# Patient Record
Sex: FEMALE | URBAN - METROPOLITAN AREA
[De-identification: names, ages, dates, MRNs, and addresses within clinical notes are randomized per-mention and may not be internally consistent; named-entity substitution may affect disease eponyms.]

---

## 2021-04-09 ENCOUNTER — OUTPATIENT (OUTPATIENT)
Dept: OUTPATIENT SERVICES | Facility: HOSPITAL | Age: 51
LOS: 1 days | End: 2021-04-09
Payer: SELF-PAY

## 2021-04-09 ENCOUNTER — APPOINTMENT (OUTPATIENT)
Dept: CT IMAGING | Facility: HOSPITAL | Age: 51
End: 2021-04-09

## 2021-04-09 PROCEDURE — 75571 CT HRT W/O DYE W/CA TEST: CPT

## 2021-04-09 PROCEDURE — 75571 CT HRT W/O DYE W/CA TEST: CPT | Mod: 26

## 2021-08-06 ENCOUNTER — RESULT REVIEW (OUTPATIENT)
Age: 51
End: 2021-08-06

## 2021-12-04 ENCOUNTER — APPOINTMENT (OUTPATIENT)
Dept: MAMMOGRAPHY | Facility: CLINIC | Age: 51
End: 2021-12-04
Payer: COMMERCIAL

## 2021-12-04 ENCOUNTER — APPOINTMENT (OUTPATIENT)
Dept: ULTRASOUND IMAGING | Facility: CLINIC | Age: 51
End: 2021-12-04
Payer: COMMERCIAL

## 2021-12-04 PROCEDURE — 77067 SCR MAMMO BI INCL CAD: CPT

## 2021-12-04 PROCEDURE — 76641 ULTRASOUND BREAST COMPLETE: CPT | Mod: 50

## 2021-12-04 PROCEDURE — 77063 BREAST TOMOSYNTHESIS BI: CPT

## 2022-12-10 ENCOUNTER — APPOINTMENT (OUTPATIENT)
Dept: ULTRASOUND IMAGING | Facility: CLINIC | Age: 52
End: 2022-12-10

## 2022-12-10 ENCOUNTER — APPOINTMENT (OUTPATIENT)
Dept: MAMMOGRAPHY | Facility: CLINIC | Age: 52
End: 2022-12-10

## 2022-12-10 PROCEDURE — 76641 ULTRASOUND BREAST COMPLETE: CPT | Mod: 50

## 2022-12-10 PROCEDURE — 77063 BREAST TOMOSYNTHESIS BI: CPT

## 2022-12-10 PROCEDURE — 77067 SCR MAMMO BI INCL CAD: CPT

## 2023-01-25 PROBLEM — Z76.89 ESTABLISHING CARE WITH NEW DOCTOR, ENCOUNTER FOR: Status: ACTIVE | Noted: 2023-01-25

## 2023-01-26 ENCOUNTER — APPOINTMENT (OUTPATIENT)
Dept: GYNECOLOGIC ONCOLOGY | Facility: CLINIC | Age: 53
End: 2023-01-26
Payer: COMMERCIAL

## 2023-01-26 VITALS
SYSTOLIC BLOOD PRESSURE: 138 MMHG | HEIGHT: 64 IN | DIASTOLIC BLOOD PRESSURE: 85 MMHG | HEART RATE: 76 BPM | WEIGHT: 175 LBS | BODY MASS INDEX: 29.88 KG/M2

## 2023-01-26 DIAGNOSIS — Z76.89 PERSONS ENCOUNTERING HEALTH SERVICES IN OTHER SPECIFIED CIRCUMSTANCES: ICD-10-CM

## 2023-01-26 DIAGNOSIS — Z87.898 PERSONAL HISTORY OF OTHER SPECIFIED CONDITIONS: ICD-10-CM

## 2023-01-26 DIAGNOSIS — Z78.9 OTHER SPECIFIED HEALTH STATUS: ICD-10-CM

## 2023-01-26 DIAGNOSIS — H04.123 DRY EYE SYNDROME OF BILATERAL LACRIMAL GLANDS: ICD-10-CM

## 2023-01-26 PROCEDURE — 99203 OFFICE O/P NEW LOW 30 MIN: CPT | Mod: 25

## 2023-01-26 PROCEDURE — 57456 ENDOCERV CURETTAGE W/SCOPE: CPT

## 2023-01-26 RX ORDER — CALCIUM CITRATE/VITAMIN D3 250MG-5MCG
TABLET ORAL
Refills: 0 | Status: ACTIVE | COMMUNITY

## 2023-01-26 RX ORDER — CYCLOSPORINE 0.5 MG/ML
0.05 EMULSION OPHTHALMIC
Refills: 0 | Status: ACTIVE | COMMUNITY

## 2023-01-26 RX ORDER — ATENOLOL 25 MG/1
25 TABLET ORAL
Refills: 0 | Status: ACTIVE | COMMUNITY

## 2023-01-30 ENCOUNTER — RESULT REVIEW (OUTPATIENT)
Age: 53
End: 2023-01-30

## 2023-01-30 ENCOUNTER — OUTPATIENT (OUTPATIENT)
Dept: OUTPATIENT SERVICES | Facility: HOSPITAL | Age: 53
LOS: 1 days | End: 2023-01-30
Payer: COMMERCIAL

## 2023-01-30 DIAGNOSIS — R87.612 LOW GRADE SQUAMOUS INTRAEPITHELIAL LESION ON CYTOLOGIC SMEAR OF CERVIX (LGSIL): ICD-10-CM

## 2023-01-30 PROCEDURE — 88321 CONSLTJ&REPRT SLD PREP ELSWR: CPT

## 2023-02-01 LAB — SURGICAL PATHOLOGY STUDY: SIGNIFICANT CHANGE UP

## 2023-02-02 ENCOUNTER — OUTPATIENT (OUTPATIENT)
Dept: OUTPATIENT SERVICES | Facility: HOSPITAL | Age: 53
LOS: 1 days | End: 2023-02-02

## 2023-02-02 ENCOUNTER — RESULT REVIEW (OUTPATIENT)
Age: 53
End: 2023-02-02

## 2023-02-02 DIAGNOSIS — N88.2 STRICTURE AND STENOSIS OF CERVIX UTERI: ICD-10-CM

## 2023-02-02 DIAGNOSIS — B97.7 PAPILLOMAVIRUS AS THE CAUSE OF DISEASES CLASSIFIED ELSEWHERE: ICD-10-CM

## 2023-02-02 LAB
CORE LAB BIOPSY: NORMAL
SURGICAL PATHOLOGY STUDY: SIGNIFICANT CHANGE UP

## 2023-02-07 ENCOUNTER — APPOINTMENT (OUTPATIENT)
Dept: ULTRASOUND IMAGING | Facility: CLINIC | Age: 53
End: 2023-02-07
Payer: COMMERCIAL

## 2023-02-07 PROCEDURE — 76830 TRANSVAGINAL US NON-OB: CPT

## 2023-02-17 ENCOUNTER — NON-APPOINTMENT (OUTPATIENT)
Age: 53
End: 2023-02-17

## 2023-02-24 ENCOUNTER — NON-APPOINTMENT (OUTPATIENT)
Age: 53
End: 2023-02-24

## 2023-04-24 ENCOUNTER — APPOINTMENT (OUTPATIENT)
Dept: UROLOGY | Facility: CLINIC | Age: 53
End: 2023-04-24
Payer: COMMERCIAL

## 2023-04-24 VITALS
WEIGHT: 165 LBS | HEART RATE: 85 BPM | BODY MASS INDEX: 28.17 KG/M2 | HEIGHT: 64 IN | SYSTOLIC BLOOD PRESSURE: 130 MMHG | RESPIRATION RATE: 16 BRPM | DIASTOLIC BLOOD PRESSURE: 84 MMHG

## 2023-04-24 DIAGNOSIS — N28.1 CYST OF KIDNEY, ACQUIRED: ICD-10-CM

## 2023-04-24 DIAGNOSIS — Z80.42 FAMILY HISTORY OF MALIGNANT NEOPLASM OF PROSTATE: ICD-10-CM

## 2023-04-24 PROCEDURE — 99203 OFFICE O/P NEW LOW 30 MIN: CPT

## 2023-04-24 RX ORDER — FLUTICASONE PROPIONATE 50 UG/1
50 SPRAY, METERED NASAL
Qty: 16 | Refills: 0 | Status: ACTIVE | COMMUNITY
Start: 2022-07-13

## 2023-04-24 RX ORDER — ALPRAZOLAM 0.25 MG/1
0.25 TABLET ORAL
Qty: 30 | Refills: 0 | Status: ACTIVE | COMMUNITY
Start: 2023-04-03

## 2023-04-24 RX ORDER — COVID-19 MOLECULAR TEST ASSAY
KIT MISCELLANEOUS
Qty: 8 | Refills: 0 | Status: COMPLETED | COMMUNITY
Start: 2023-01-08 | End: 2023-04-24

## 2023-04-24 RX ORDER — FLUOXETINE HYDROCHLORIDE 10 MG/1
10 CAPSULE ORAL
Qty: 30 | Refills: 0 | Status: ACTIVE | COMMUNITY
Start: 2023-03-16

## 2023-04-25 NOTE — HISTORY OF PRESENT ILLNESS
[FreeTextEntry1] : Patient is a 52 year old woman comes in today for consultation.\par \par Transvaginal-transabdominal pelvic US demonstrated small left renal parapelvic cysts and less likely mild left hydronephrosis \par \par Denies any bothersome urinary symptoms. Denies any hematuria, dysuria or incontinence. Denies any UTIs\par Denies any abdominal pain or flank pain. Denies any history of kidney stones \par \par Father had a history of prostate cancer\par Denies any history of kidney stones \par \par Works at a law firm \par Denies any known environmental exposure

## 2023-04-25 NOTE — ASSESSMENT
[FreeTextEntry1] : US images reviewed in detail with the patient.\par Findings consistent with parapelvic cysts.  All appear simple, no nodularity or septations.\par No intervention required.  Does not require followup imaging in the absence of symptoms.\par All questions answered.

## 2023-07-26 NOTE — PROCEDURE
[Colposcopy] : colposcopy [Cervical Dysplasia] : cervical dysplasia [HPV high risk] : PCR positive for high risk HPV [Patient] : the patient [Verbal Consent] : verbal consent was obtained prior to the procedure and is detailed in the patient's record [Site Verification] : site verification performed. [Time Out] : Time Out Procedure:The following was confirmed prior to the procedure-Correct patient identity, correct site, agreement on the procedure to be done and correct patient position. [No Abnormalities] : no abnormalities seen [ECC Done] : an Endocervical curettage was performed.  [SCJ Fully Visualized] : the squamocolumnar junction was not fully visualized [Biopsies Taken: # ___] : no biopsies were taken of the cervix [de-identified] : Allis clamp applied to anterior lip of cervix, epithelialized pinpoint os visualized, #11 blade used to incise epithelium and dilator used, then slim ECC curette was able to be inserted.

## 2023-07-26 NOTE — LETTER BODY
[Dear  ___] : Dear  [unfilled], [I had the pleasure of evaluating your patient, [unfilled] for ___] : I had the pleasure of evaluating your patient, [unfilled] for [unfilled]. [Attached please find my note.] : Attached please find my note. [FreeTextEntry2] : ECC was performed and was benign. \par I recommend she return in 6m. \par Thank you very much for referring this fe patient.  [FreeTextEntry1] : ECC, sono

## 2023-07-26 NOTE — PHYSICAL EXAM
[Chaperone Present] : A chaperone was present in the examining room during all aspects of the physical examination [Normal] : Anus and perineum: Normal sphincter tone, no masses, no prolapse. [Fully active, able to carry on all pre-disease performance without restriction] : Status 0 - Fully active, able to carry on all pre-disease performance without restriction [de-identified] : no patent external os, completely epithelialized with adhesions of posterior lip of cervix to the posterior vaginal mucosa; see colpo note

## 2023-07-26 NOTE — HISTORY OF PRESENT ILLNESS
[FreeTextEntry1] : Ms. Harrington, 52 years old, was referred by Dr. Pascual Cruz for abnormal pap smears. \par She reports a h/o two excisional procedures decades ago, either LEEP or CKC, performed in the OR each time, one by Dr. Toi Cruz and one by Dr. Leach circa 2005. \par She is unsure whether she had HSIL each time, and doesn’t recall any other details. \par She is a nonsmoker. She denies any VB or other associated signs or symptoms.\par She attends today's visit with her mother Rowena.\par Pt has been under significant stress lately losing her dad one year ago.  She lives with her mother.\par \par Denies f/c/n/v/d/vaginal bleeding, changes to bowel or bladder habits.  Denies unintentional weight loss or gain. Denies post-coital bleeding, abdominal pain, bloating or any other associated symptoms.  \par \par Pathology:\par 12/21/22 Colposcopy ([facility])\par 1)  Cervix 12 o'clock:  partially denuded atrophic mucosa with chronic cervicitis with squamous metaplasia. \par 2)  ECC:  Scant mucus and no tissue. \par \par PAP:\par 10/12/22:  LSIL; +HPVmRNA ([facility])\par 5/18/22:  LSIL;  +HPVmRNA ([facility])\par \par Health Maintenance:\par BMI: 30\par Lifestyle: lives with mother, KWADWO.  Pt has had egg retrieval about 13 years ago.  Eggs are stored within an Health system facility.  She states likely she wouldn't use them but not 100% sure at this time.  \par Gardasil Vaccine received: no\par COVID vaccine received:  yes\par Mammogram: 12/10/22 BIRADS 1\par Colonoscopy: 8/6/21\par PAP:  as above\par \par GYN/Ref:  Pascual Cruz MD\par PCP:  Shamika Zavala MD\par Cards:  Jose Alfredo Garcia MD\par GI:  Grupo Shrestha MD\par \par \par

## 2023-07-27 ENCOUNTER — APPOINTMENT (OUTPATIENT)
Dept: GYNECOLOGIC ONCOLOGY | Facility: CLINIC | Age: 53
End: 2023-07-27

## 2023-08-03 ENCOUNTER — APPOINTMENT (OUTPATIENT)
Dept: GYNECOLOGIC ONCOLOGY | Facility: CLINIC | Age: 53
End: 2023-08-03
Payer: COMMERCIAL

## 2023-08-03 ENCOUNTER — LABORATORY RESULT (OUTPATIENT)
Age: 53
End: 2023-08-03

## 2023-08-03 VITALS
BODY MASS INDEX: 29.7 KG/M2 | HEART RATE: 84 BPM | SYSTOLIC BLOOD PRESSURE: 126 MMHG | DIASTOLIC BLOOD PRESSURE: 82 MMHG | WEIGHT: 173 LBS

## 2023-08-03 DIAGNOSIS — N89.5 STRICTURE AND ATRESIA OF VAGINA: ICD-10-CM

## 2023-08-03 PROCEDURE — 99213 OFFICE O/P EST LOW 20 MIN: CPT | Mod: 25

## 2023-08-03 PROCEDURE — 57456 ENDOCERV CURETTAGE W/SCOPE: CPT

## 2023-08-03 NOTE — LETTER BODY
[Dear  ___] : Dear  [unfilled], [Attached please find my note.] : Attached please find my note. [I recently saw our patient [unfilled] for a follow-up visit.] : I recently saw our patient, [unfilled] for a follow-up visit. [FreeTextEntry2] : PAP was ... I recommend she return in 6m.  Thank you again for referring this fe patient.  [FreeTextEntry1] : pap/hpv/ecc

## 2023-08-03 NOTE — HISTORY OF PRESENT ILLNESS
[FreeTextEntry1] : Ms. Harrington, 52 years old, was referred by Dr. Pascual Cruz for abnormal pap smears.  She reports a h/o two excisional procedures decades ago, either LEEP or CKC, performed in the OR each time, one by Dr. Toi Cruz and one by Dr. Leach circa 2005.  She is unsure whether she had HSIL each time, and doesn't recall any other details.  She is a nonsmoker. She denies any VB or other associated signs or symptoms. She attends today's visit with her mother Rowena. Pt has been under significant stress lately losing her dad one year ago.  She lives with her mother.  Denies f/c/n/v/d/vaginal bleeding, changes to bowel or bladder habits.  Denies unintentional weight loss or gain. Denies post-coital bleeding, abdominal pain, bloating or any other associated symptoms.    Pathology: 12/21/22 Colposcopy ([facility]) 1)  Cervix 12 o'clock:  partially denuded atrophic mucosa with chronic cervicitis with squamous metaplasia.  2)  ECC:  Scant mucus and no tissue.   1/26/23- ECC- negative  PAP: 10/12/22:  LSIL; +HPVmRNA ([facility]) 5/18/22:  LSIL;  +HPVmRNA ([facility]) 2/2023: LSIL (review  2/7/23- TVUS- Pedunculated fibroid.  Normal right ovary. Small simple left ovarian cyst. Incidental note is made of probable small left renal parapelvic cysts and less likely mild left hydronephrosis. If clinically warranted, CT scan may be performed for confirmation. Saw Dr. Reyna from Urology  Health Maintenance: BMI: 30 Lifestyle: lives with mother, G0.  Pt has had egg retrieval about 13 years ago.  Eggs are stored within an [facility] facility.  She states likely she wouldn't use them but not 100% sure at this time.   Gardasil Vaccine received: no COVID vaccine received:  yes Mammogram: 12/10/22 BIRADS 1 Colonoscopy: 8/6/21 PAP:  as above  GYN/Ref:  Pascual Cruz MD PCP:  Shamika Zavala MD Cards:  Jose Alfredo Garcia MD GI:  Grupo Shrestha MD

## 2023-08-03 NOTE — PROCEDURE
[Colposcopy] : colposcopy [Cervical Dysplasia] : cervical dysplasia [HPV high risk] : PCR positive for high risk HPV [Patient] : the patient [Verbal Consent] : verbal consent was obtained prior to the procedure and is detailed in the patient's record [Site Verification] : site verification performed. [Time Out] : Time Out Procedure:The following was confirmed prior to the procedure-Correct patient identity, correct site, agreement on the procedure to be done and correct patient position. [No Abnormalities] : no abnormalities seen [ECC Done] : an Endocervical curettage was performed.  [SCJ Fully Visualized] : the squamocolumnar junction was not fully visualized [Biopsies Taken: # ___] : no biopsies were taken of the cervix [1] : 1 [de-identified] : Allis clamp applied to anterior lip of cervix, epithelialized pinpoint os visualized, dilator used; slim ECC curette was able to be inserted.

## 2023-08-03 NOTE — ASSESSMENT
[FreeTextEntry1] : 53y/o with LSIL PAP and cervical stenosis due to prior excisional procedures. Overall benign colpo exam.

## 2023-08-03 NOTE — DISCUSSION/SUMMARY
[Reviewed Clinical Lab Test(s)] : Results of clinical tests were reviewed. [FreeTextEntry1] : - f/u PAP/HPV/ECC , instructions reviewed - reviewed the etiology, natural history, diagnosis and management of genital HPV infection.  - pending results, 6m vs 12m followup will be recommended.  All questions were answered to her apparent satisfaction.

## 2023-08-03 NOTE — PHYSICAL EXAM
[Chaperone Present] : A chaperone was present in the examining room during all aspects of the physical examination [Normal] : Anus and perineum: Normal sphincter tone, no masses, no prolapse. [Fully active, able to carry on all pre-disease performance without restriction] : Status 0 - Fully active, able to carry on all pre-disease performance without restriction [de-identified] : no patent external os, completely epithelialized with adhesions of posterior lip of cervix to the posterior vaginal mucosa; see colpo note

## 2023-10-11 LAB
CORE LAB BIOPSY: NORMAL
CYTOLOGY CVX/VAG DOC THIN PREP: ABNORMAL
HPV HIGH+LOW RISK DNA PNL CVX: DETECTED

## 2023-12-16 ENCOUNTER — APPOINTMENT (OUTPATIENT)
Dept: MAMMOGRAPHY | Facility: CLINIC | Age: 53
End: 2023-12-16
Payer: COMMERCIAL

## 2023-12-16 ENCOUNTER — APPOINTMENT (OUTPATIENT)
Dept: ULTRASOUND IMAGING | Facility: CLINIC | Age: 53
End: 2023-12-16
Payer: COMMERCIAL

## 2023-12-16 PROCEDURE — 76641 ULTRASOUND BREAST COMPLETE: CPT | Mod: 50

## 2023-12-16 PROCEDURE — 77063 BREAST TOMOSYNTHESIS BI: CPT

## 2023-12-16 PROCEDURE — 77067 SCR MAMMO BI INCL CAD: CPT

## 2024-02-07 PROBLEM — R87.619 ABNORMAL PAP SMEAR OF CERVIX: Status: ACTIVE | Noted: 2023-01-25

## 2024-02-07 PROBLEM — B97.7 HIGH RISK HPV INFECTION: Status: ACTIVE | Noted: 2023-01-25

## 2024-02-07 NOTE — PHYSICAL EXAM
[Chaperone Present] : A chaperone was present in the examining room during all aspects of the physical examination [Normal] : Anus and perineum: Normal sphincter tone, no masses, no prolapse. [de-identified] : no patent external os, completely epithelialized with adhesions of posterior lip of cervix to the posterior vaginal mucosa; see colpo note [Fully active, able to carry on all pre-disease performance without restriction] : Status 0 - Fully active, able to carry on all pre-disease performance without restriction

## 2024-02-08 ENCOUNTER — LABORATORY RESULT (OUTPATIENT)
Age: 54
End: 2024-02-08

## 2024-02-08 ENCOUNTER — APPOINTMENT (OUTPATIENT)
Dept: GYNECOLOGIC ONCOLOGY | Facility: CLINIC | Age: 54
End: 2024-02-08
Payer: COMMERCIAL

## 2024-02-08 VITALS
HEIGHT: 64 IN | WEIGHT: 174 LBS | SYSTOLIC BLOOD PRESSURE: 129 MMHG | DIASTOLIC BLOOD PRESSURE: 82 MMHG | HEART RATE: 83 BPM | BODY MASS INDEX: 29.71 KG/M2

## 2024-02-08 DIAGNOSIS — R87.619 UNSPECIFIED ABNORMAL CYTOLOGICAL FINDINGS IN SPECIMENS FROM CERVIX UTERI: ICD-10-CM

## 2024-02-08 DIAGNOSIS — B97.7 PAPILLOMAVIRUS AS THE CAUSE OF DISEASES CLASSIFIED ELSEWHERE: ICD-10-CM

## 2024-02-08 DIAGNOSIS — N88.2 STRICTURE AND STENOSIS OF CERVIX UTERI: ICD-10-CM

## 2024-02-08 DIAGNOSIS — N84.1 POLYP OF CERVIX UTERI: ICD-10-CM

## 2024-02-08 PROCEDURE — 57454 BX/CURETT OF CERVIX W/SCOPE: CPT

## 2024-02-08 PROCEDURE — 99213 OFFICE O/P EST LOW 20 MIN: CPT | Mod: 25

## 2024-02-09 LAB — HPV HIGH+LOW RISK DNA PNL CVX: DETECTED

## 2024-02-20 LAB
CORE LAB BIOPSY: NORMAL
CYTOLOGY CVX/VAG DOC THIN PREP: ABNORMAL

## 2024-02-20 NOTE — DISCUSSION/SUMMARY
[Reviewed Clinical Lab Test(s)] : Results of clinical tests were reviewed. [FreeTextEntry1] : - f/u PAP/HPV/ECC/cervical biopsy , instructions reviewed - reviewed the etiology, natural history, diagnosis and management of genital HPV infection.  - 6m followup recommended , pending results.  - HM reviewed. - All questions were answered to her apparent satisfaction.  addendum: PAP ASCUS/+HPV16; ECC benign, cervical polyp- benign; f/u 6m; Patient notified via RN tasklist. LDS

## 2024-02-20 NOTE — ASSESSMENT
[FreeTextEntry1] : 52 y/o with LSIL PAP and cervical stenosis due to prior excisional procedures. Overall benign colpo exam.

## 2024-02-20 NOTE — HISTORY OF PRESENT ILLNESS
[FreeTextEntry1] : Ms. Harrington was referred by Dr. Pascual Cruz for abnormal pap smears.  She reports a h/o two excisional procedures decades ago, either LEEP or CKC, performed in the OR each time, one by Dr. Toi Cruz and one by Dr. Leach circa 2005.  She is unsure whether she had HSIL each time, and doesn't recall any other details.  She is a nonsmoker.   She denies any VB or other associated signs or symptoms. Pt has been under significant stress lately losing her dad one year ago.  She lives with her mother.  Pathology: 12/21/22 Colposcopy ([facility]) 1)  Cervix 12 o'clock:  partially denuded atrophic mucosa with chronic cervicitis with squamous metaplasia.  2)  ECC:  Scant mucus and no tissue.   1/26/23- ECC- negative  PAP: 10/12/22:  LSIL; +HPVmRNA ([facility]) 5/18/22:  LSIL;  +HPVmRNA ([facility]) 2/2023: LSIL (review)  2/7/23- TVUS- Pedunculated fibroid.  Normal right ovary. Small simple left ovarian cyst. Incidental note is made of probable small left renal parapelvic cysts and less likely mild left hydronephrosis. If clinically warranted, CT scan may be performed for confirmation. Saw Dr. Reyna from Urology  8/2023- pap- LSIL, HRHPV (+)_16(+) 18/45 (-) ECC (-)  Health Maintenance: BMI: 30 Lifestyle: lives with mother, G0.  Pt has had egg retrieval about 13 years ago.  Eggs are stored within an [facility] facility.  She states likely she wouldn't use them but not 100% sure . Gardasil Vaccine received: no COVID vaccine received:  yes Mammogram/sono: 12/2023 BIRADS 1 per pt Colonoscopy: 8/6/21  GYN/Ref:  [facility] Cruz [facility] PCP:  Shamika Zavala MD Cards:  Jose Alfredo Garcia MD GI:  Grupo Shrestha MD

## 2024-02-20 NOTE — LETTER BODY
[Dear  ___] : Dear  [unfilled], [I recently saw our patient [unfilled] for a follow-up visit.] : I recently saw our patient, [unfilled] for a follow-up visit. [Attached please find my note.] : Attached please find my note. [FreeTextEntry2] : She returned for followup. I recommend she return in 6m.  Thank you again for referring this fe patient.  [FreeTextEntry1] : pap/hpv/bxs

## 2024-02-20 NOTE — PROCEDURE
[Colposcopy] : colposcopy [Cervical Dysplasia] : cervical dysplasia [HPV high risk] : PCR positive for high risk HPV [Patient] : the patient [Verbal Consent] : verbal consent was obtained prior to the procedure and is detailed in the patient's record [Site Verification] : site verification performed. [Time Out] : Time Out Procedure:The following was confirmed prior to the procedure-Correct patient identity, correct site, agreement on the procedure to be done and correct patient position. [No Abnormalities] : no abnormalities seen [ECC Done] : an Endocervical curettage was performed.  [1] : 1 [Silver Nitrate] : silver nitrate [Direct Pressure] : direct pressure [No Complications] : none [Tolerated Well] : the patient tolerated the procedure well [Post procedure instructions and information given] : post procedure instructions and information given and reviewed with patient. [SCJ Fully Visualized] : the squamocolumnar junction was not fully visualized [Biopsies Taken: # ___] : no biopsies were taken of the cervix [FreeTextEntry2] : 6mm endocervical polyp seen at ext os [de-identified] : Allis clamp applied to anterior lip of cervix, epithelialized pinpoint os visualized, cervical polyp removed; dilator used; slim ECC curette was able to be inserted.

## 2024-09-05 ENCOUNTER — LABORATORY RESULT (OUTPATIENT)
Age: 54
End: 2024-09-05

## 2024-09-05 ENCOUNTER — APPOINTMENT (OUTPATIENT)
Dept: GYNECOLOGIC ONCOLOGY | Facility: CLINIC | Age: 54
End: 2024-09-05
Payer: COMMERCIAL

## 2024-09-05 ENCOUNTER — NON-APPOINTMENT (OUTPATIENT)
Age: 54
End: 2024-09-05

## 2024-09-05 VITALS
HEIGHT: 64 IN | WEIGHT: 168 LBS | BODY MASS INDEX: 28.68 KG/M2 | OXYGEN SATURATION: 94 % | HEART RATE: 75 BPM | DIASTOLIC BLOOD PRESSURE: 74 MMHG | SYSTOLIC BLOOD PRESSURE: 122 MMHG

## 2024-09-05 DIAGNOSIS — R87.619 UNSPECIFIED ABNORMAL CYTOLOGICAL FINDINGS IN SPECIMENS FROM CERVIX UTERI: ICD-10-CM

## 2024-09-05 DIAGNOSIS — B97.7 PAPILLOMAVIRUS AS THE CAUSE OF DISEASES CLASSIFIED ELSEWHERE: ICD-10-CM

## 2024-09-05 PROCEDURE — 99459 PELVIC EXAMINATION: CPT

## 2024-09-05 PROCEDURE — 57456 ENDOCERV CURETTAGE W/SCOPE: CPT

## 2024-09-05 PROCEDURE — 99213 OFFICE O/P EST LOW 20 MIN: CPT | Mod: 25

## 2024-09-05 NOTE — REVIEW OF SYSTEMS
Case Management Discharge Note      Final Note: DC before CM completed initial assessment.       Final Discharge Disposition Code: 01 - home or self-care     Val Adamson RN, MSN  Care Manager  [phone]   [Negative] : Musculoskeletal

## 2024-09-05 NOTE — HISTORY OF PRESENT ILLNESS
[FreeTextEntry1] : Ms. Harrington was referred by Dr. Pascual Cruz for abnormal pap smears.  She reports a h/o two excisional procedures decades ago, either LEEP or CKC, performed in the OR each time, one by Dr. Toi Cruz and one by Dr. Leach circa 2005.  She is unsure whether she had HSIL each time, and doesn't recall any other details.  She is a nonsmoker.   She denies any VB or other associated signs or symptoms. She lives with her mother.  Pathology: 12/21/22 Colposcopy ([facility]) 1)  Cervix 12 o'clock:  partially denuded atrophic mucosa with chronic cervicitis with squamous metaplasia.  2)  ECC:  Scant mucus and no tissue.   1/26/23- ECC- negative  PAP: 10/12/22:  LSIL; +HPVmRNA ([facility]) 5/18/22:  LSIL;  +HPVmRNA ([facility]) 2/2023: LSIL (review)  2/7/23- TVUS- Pedunculated fibroid.  Normal right ovary. Small simple left ovarian cyst. Incidental note is made of probable small left renal parapelvic cysts and less likely mild left hydronephrosis. If clinically warranted, CT scan may be performed for confirmation. Saw Dr. Reyna from Urology  8/2023- pap- LSIL, HRHPV (+)_16(+) 18/45 (-) ECC (-) 2/2024:  PAP ASCUS/+HPV16; ECC benign, cervical polyp- benign; f/u 6m   Health Maintenance: BMI: 30 Lifestyle: lives with mother, G0.  Pt has had egg retrieval about 13 years ago.  Eggs are stored within an [facility] facility.  She states likely she wouldn't use them but not 100% sure . Gardasil Vaccine received: no COVID vaccine received:  yes Mammogram/sono: 12/2023 BIRADS 1 per pt Colonoscopy: 8/6/21 negative per pt  GYN/Ref:  Pascual Cruz MD - seekeeley cevallos PCP:  Shamika Zavala MD Cards:  Jose Alfredo Garcia MD GI:  Grupo Shrestha MD

## 2024-09-05 NOTE — PHYSICAL EXAM
[Chaperone Present] : A chaperone was present in the examining room during all aspects of the physical examination [[id number]] : A chaperone was present during the pelvic exam. [Normal] : Anus and perineum: Normal sphincter tone, no masses, no prolapse. [Fully active, able to carry on all pre-disease performance without restriction] : Status 0 - Fully active, able to carry on all pre-disease performance without restriction [FreeTextEntry2] : Rosa [de-identified] : flush pinpoint os, no gross lesions

## 2024-09-05 NOTE — DISCUSSION/SUMMARY
[Reviewed Clinical Lab Test(s)] : Results of clinical tests were reviewed. [FreeTextEntry1] : - f/u PAP/HPV/ECC (scant sample), instructions reviewed - reviewed the etiology, natural history, diagnosis and management of genital HPV infection.  - 6m followup recommended , pending results.  - HM reviewed. Annual GYN HM in December.  - All questions were answered to her apparent satisfaction.

## 2024-09-05 NOTE — PROCEDURE
[Colposcopy] : colposcopy [Cervical Dysplasia] : cervical dysplasia [HPV high risk] : PCR positive for high risk HPV [Patient] : the patient [Verbal Consent] : verbal consent was obtained prior to the procedure and is detailed in the patient's record [Site Verification] : site verification performed. [Time Out] : Time Out Procedure:The following was confirmed prior to the procedure-Correct patient identity, correct site, agreement on the procedure to be done and correct patient position. [No Abnormalities] : no abnormalities seen [ECC Done] : an Endocervical curettage was performed.  [Silver Nitrate] : silver nitrate [Direct Pressure] : direct pressure [No Complications] : none [Tolerated Well] : the patient tolerated the procedure well [Post procedure instructions and information given] : post procedure instructions and information given and reviewed with patient. [1] : 1 [SCJ Fully Visualized] : the squamocolumnar junction was not fully visualized [Biopsies Taken: # ___] : no biopsies were taken of the cervix [FreeTextEntry2] : 6mm endocervical polyp seen at ext os [de-identified] : Allis clamp applied to anterior lip of cervix, epithelialized pinpoint os visualized, dilator used; slim ECC curette used

## 2024-09-05 NOTE — ASSESSMENT
[FreeTextEntry1] : 55 y/o with LSIL PAP and cervical stenosis due to prior excisional procedures. Overall benign colpo exam.

## 2024-09-05 NOTE — LETTER BODY
[Dear  ___] : Dear  [unfilled], [I recently saw our patient [unfilled] for a follow-up visit.] : I recently saw our patient, [unfilled] for a follow-up visit. [Attached please find my note.] : Attached please find my note. [FreeTextEntry2] : She returned for followup. I recommend she return in 6m.  Thank you again for referring this fe patient.  [FreeTextEntry1] : pap/hpv/ECC

## 2024-09-09 LAB
CYTOLOGY CVX/VAG DOC THIN PREP: ABNORMAL
HPV HIGH+LOW RISK DNA PNL CVX: DETECTED

## 2024-09-10 LAB — CORE LAB BIOPSY: NORMAL

## 2024-12-20 ENCOUNTER — APPOINTMENT (OUTPATIENT)
Dept: RADIOLOGY | Facility: CLINIC | Age: 54
End: 2024-12-20
Payer: COMMERCIAL

## 2024-12-20 ENCOUNTER — APPOINTMENT (OUTPATIENT)
Dept: MAMMOGRAPHY | Facility: CLINIC | Age: 54
End: 2024-12-20
Payer: COMMERCIAL

## 2024-12-20 ENCOUNTER — APPOINTMENT (OUTPATIENT)
Dept: ULTRASOUND IMAGING | Facility: CLINIC | Age: 54
End: 2024-12-20
Payer: COMMERCIAL

## 2024-12-20 PROCEDURE — 77063 BREAST TOMOSYNTHESIS BI: CPT

## 2024-12-20 PROCEDURE — 77067 SCR MAMMO BI INCL CAD: CPT

## 2024-12-20 PROCEDURE — 77080 DXA BONE DENSITY AXIAL: CPT

## 2024-12-20 PROCEDURE — 76641 ULTRASOUND BREAST COMPLETE: CPT | Mod: 50

## 2025-02-07 ENCOUNTER — NON-APPOINTMENT (OUTPATIENT)
Age: 55
End: 2025-02-07

## 2025-02-07 ENCOUNTER — APPOINTMENT (OUTPATIENT)
Dept: OTOLARYNGOLOGY | Facility: CLINIC | Age: 55
End: 2025-02-07
Payer: COMMERCIAL

## 2025-02-07 DIAGNOSIS — F10.90 ALCOHOL USE, UNSPECIFIED, UNCOMPLICATED: ICD-10-CM

## 2025-02-07 DIAGNOSIS — R04.0 EPISTAXIS: ICD-10-CM

## 2025-02-07 DIAGNOSIS — K21.9 GASTRO-ESOPHAGEAL REFLUX DISEASE W/OUT ESOPHAGITIS: ICD-10-CM

## 2025-02-07 DIAGNOSIS — Z83.6 FAMILY HISTORY OF OTHER DISEASES OF THE RESPIRATORY SYSTEM: ICD-10-CM

## 2025-02-07 DIAGNOSIS — Z80.42 FAMILY HISTORY OF MALIGNANT NEOPLASM OF PROSTATE: ICD-10-CM

## 2025-02-07 PROCEDURE — 31575 DIAGNOSTIC LARYNGOSCOPY: CPT

## 2025-02-07 PROCEDURE — 99203 OFFICE O/P NEW LOW 30 MIN: CPT | Mod: 25

## 2025-02-07 RX ORDER — FLUOXETINE HYDROCHLORIDE 40 MG/1
CAPSULE ORAL
Refills: 0 | Status: ACTIVE | COMMUNITY

## 2025-02-07 RX ORDER — ATENOLOL 50 MG/1
TABLET ORAL
Refills: 0 | Status: ACTIVE | COMMUNITY

## 2025-03-20 ENCOUNTER — LABORATORY RESULT (OUTPATIENT)
Age: 55
End: 2025-03-20

## 2025-03-20 ENCOUNTER — APPOINTMENT (OUTPATIENT)
Dept: GYNECOLOGIC ONCOLOGY | Facility: CLINIC | Age: 55
End: 2025-03-20
Payer: COMMERCIAL

## 2025-03-20 VITALS
SYSTOLIC BLOOD PRESSURE: 117 MMHG | HEIGHT: 64 IN | DIASTOLIC BLOOD PRESSURE: 77 MMHG | BODY MASS INDEX: 29.88 KG/M2 | HEART RATE: 73 BPM | TEMPERATURE: 97.5 F | OXYGEN SATURATION: 97 % | WEIGHT: 175 LBS

## 2025-03-20 DIAGNOSIS — B97.7 PAPILLOMAVIRUS AS THE CAUSE OF DISEASES CLASSIFIED ELSEWHERE: ICD-10-CM

## 2025-03-20 DIAGNOSIS — N88.2 STRICTURE AND STENOSIS OF CERVIX UTERI: ICD-10-CM

## 2025-03-20 DIAGNOSIS — R87.619 UNSPECIFIED ABNORMAL CYTOLOGICAL FINDINGS IN SPECIMENS FROM CERVIX UTERI: ICD-10-CM

## 2025-03-20 PROCEDURE — 57456 ENDOCERV CURETTAGE W/SCOPE: CPT

## 2025-03-20 PROCEDURE — 99213 OFFICE O/P EST LOW 20 MIN: CPT | Mod: 25

## 2025-03-20 PROCEDURE — 99459 PELVIC EXAMINATION: CPT

## 2025-03-26 LAB — HPV HIGH+LOW RISK DNA PNL CVX: DETECTED

## 2025-03-27 LAB — CORE LAB BIOPSY: NORMAL

## 2025-03-28 LAB — CYTOLOGY CVX/VAG DOC THIN PREP: ABNORMAL
